# Patient Record
Sex: FEMALE | Race: WHITE | NOT HISPANIC OR LATINO | URBAN - METROPOLITAN AREA
[De-identification: names, ages, dates, MRNs, and addresses within clinical notes are randomized per-mention and may not be internally consistent; named-entity substitution may affect disease eponyms.]

---

## 2017-11-04 ENCOUNTER — EMERGENCY (EMERGENCY)
Facility: HOSPITAL | Age: 82
LOS: 1 days | Discharge: HOME | End: 2017-11-04
Attending: EMERGENCY MEDICINE | Admitting: EMERGENCY MEDICINE
Payer: MEDICARE

## 2017-11-04 VITALS
HEART RATE: 70 BPM | SYSTOLIC BLOOD PRESSURE: 186 MMHG | RESPIRATION RATE: 14 BRPM | WEIGHT: 113.1 LBS | DIASTOLIC BLOOD PRESSURE: 56 MMHG | OXYGEN SATURATION: 98 % | HEIGHT: 64 IN | TEMPERATURE: 98 F

## 2017-11-04 VITALS
OXYGEN SATURATION: 98 % | SYSTOLIC BLOOD PRESSURE: 154 MMHG | TEMPERATURE: 98 F | DIASTOLIC BLOOD PRESSURE: 60 MMHG | RESPIRATION RATE: 15 BRPM | HEART RATE: 68 BPM

## 2017-11-04 PROCEDURE — 73030 X-RAY EXAM OF SHOULDER: CPT | Mod: 26,RT

## 2017-11-04 PROCEDURE — 73020 X-RAY EXAM OF SHOULDER: CPT

## 2017-11-04 PROCEDURE — 73080 X-RAY EXAM OF ELBOW: CPT | Mod: 26,RT

## 2017-11-04 PROCEDURE — 73060 X-RAY EXAM OF HUMERUS: CPT

## 2017-11-04 PROCEDURE — 73020 X-RAY EXAM OF SHOULDER: CPT | Mod: 26,59,RT

## 2017-11-04 PROCEDURE — 99285 EMERGENCY DEPT VISIT HI MDM: CPT

## 2017-11-04 PROCEDURE — 99284 EMERGENCY DEPT VISIT MOD MDM: CPT | Mod: 25

## 2017-11-04 PROCEDURE — 73060 X-RAY EXAM OF HUMERUS: CPT | Mod: 26,RT

## 2017-11-04 PROCEDURE — 73030 X-RAY EXAM OF SHOULDER: CPT

## 2017-11-04 PROCEDURE — 73080 X-RAY EXAM OF ELBOW: CPT

## 2017-11-04 RX ORDER — ASPIRIN/CALCIUM CARB/MAGNESIUM 324 MG
1 TABLET ORAL
Qty: 0 | Refills: 0 | COMMUNITY

## 2017-11-04 RX ORDER — IBUPROFEN 200 MG
1 TABLET ORAL
Qty: 40 | Refills: 0 | OUTPATIENT
Start: 2017-11-04 | End: 2017-11-14

## 2017-11-04 NOTE — ED ADULT NURSE NOTE - CHPI ED SYMPTOMS NEG
no abrasion/no back pain/no tingling/no weakness/no deformity/no numbness/no bruising/no difficulty bearing weight/no stiffness

## 2017-11-04 NOTE — ED ADULT TRIAGE NOTE - CHIEF COMPLAINT QUOTE
"I couldn't get up with my right side."  pt c/o weakness to right side, thinks she fell; per daughter, pt was home with aide, went into bedroom and shut door behind her; aide heard a thump and pt was on right side, could not get up on own; pt c/o pain and weakness to right upper arm and side

## 2017-11-04 NOTE — ED PROVIDER NOTE - OBJECTIVE STATEMENT
89 yo female s/p mechanical fall onto right side while trying to close the bathroom door today c/o right shoulder/upper arm pain.  Denies any head injury, no neck pain, no nausea/vomiting.  Having difficulty ranging right shoulder.

## 2017-11-04 NOTE — ED ADULT NURSE NOTE - PMH
Hyperlipemia    Hypertension    Osteoarthritis    Regurgitation  mild to moderate  Renal Mass    Vertigo

## 2017-11-04 NOTE — ED PROVIDER NOTE - MUSCULOSKELETAL, MLM
Spine appears normal, range of motion is limited in right shoulder secondary to pain, +right shoulder swelling +ttp, no deformity Spine appears normal, range of motion is limited in right shoulder secondary to pain, +right shoulder swelling +ttp, mild deformity

## 2017-11-04 NOTE — CONSULT NOTE ADULT - SUBJECTIVE AND OBJECTIVE BOX
89y Female RHD presents c/o R shoulder pain sp mechanical fall. Denies HS/LOC. Denies numbness/tingling. Denies fever/chills. Denies pain/injury elsewhere. No other complaints.     HEALTH ISSUES - PROBLEM Dx:  dementia, HLD, HTN, Renal mass, vertigo      MEDICATIONS  (STANDING):    Allergies    Darvocet-N 100 (Unknown)  Nexium (Unknown)    Vital Signs Last 24 Hrs  T(C): 36.9 (11-04-17 @ 15:52), Max: 36.9 (11-04-17 @ 15:52)  T(F): 98.4 (11-04-17 @ 15:52), Max: 98.4 (11-04-17 @ 15:52)  HR: 70 (11-04-17 @ 15:52) (70 - 70)  BP: 186/56 (11-04-17 @ 15:52) (186/56 - 186/56)  BP(mean): --  RR: 14 (11-04-17 @ 15:52) (14 - 14)  SpO2: 98% (11-04-17 @ 15:52) (98% - 98%)  Imaging: XR demonstrates R proximal humerus fracture    Physical Exam  Gen: NAD, Alert and Awake, Follows Commands  R UE: Skin intact, Moderate Swelling to shoulder noted. Cannot AROM shoulder due to pain. r/u/m/ain/pin function intact. SILT over deltoid. SILT C5-T1, warm to touch. 2+ radial pulse. Compartments soft and compressible.     A/P: 89y Female with R proximal humerus fracture  Pain control  NWB R UE   Sling at all times  Ice plenty  Active movement of fingers/elbow encouraged  All question answered  Follow up with Dr. Olmos as outpatient within 1 week, call office for appointment   Ortho stable  for DC 89y Female RHD presents c/o R shoulder pain sp mechanical fall. Denies HS/LOC. Denies numbness/tingling. Denies fever/chills. Denies pain/injury elsewhere. No other complaints.     HEALTH ISSUES - PROBLEM Dx:  dementia, HLD, HTN, Renal mass, vertigo      MEDICATIONS  (STANDING):    Allergies    Darvocet-N 100 (Unknown)  Nexium (Unknown)    Vital Signs Last 24 Hrs  T(C): 36.9 (11-04-17 @ 15:52), Max: 36.9 (11-04-17 @ 15:52)  T(F): 98.4 (11-04-17 @ 15:52), Max: 98.4 (11-04-17 @ 15:52)  HR: 70 (11-04-17 @ 15:52) (70 - 70)  BP: 186/56 (11-04-17 @ 15:52) (186/56 - 186/56)  BP(mean): --  RR: 14 (11-04-17 @ 15:52) (14 - 14)  SpO2: 98% (11-04-17 @ 15:52) (98% - 98%)  Imaging: XR demonstrates R proximal humerus fracture, without dislocation    Physical Exam  Gen: NAD, Alert and Awake, Follows Commands  R UE: Skin intact, Moderate Swelling to shoulder noted. Cannot AROM shoulder due to pain. r/u/m/ain/pin function intact. SILT over deltoid. SILT C5-T1, warm to touch. 2+ radial pulse. Compartments soft and compressible.     A/P: 89y Female with R proximal humerus fracture  Pain control  NWB R UE   Sling at all times  Ice plenty  Active movement of fingers/elbow encouraged  All question answered  Follow up with Dr. Olmos as outpatient within 1 week, call office for appointment   Ortho stable  for DC

## 2017-11-04 NOTE — ED PROVIDER NOTE - PROGRESS NOTE DETAILS
discussed case with [facility]o resident, recommend sling, f/u with Dr. Oloms discussed case with [facility] ortho resident, recommend sling, f/u with Dr. Olmos, patient declining percocet, just wants motrin.  Did not want any analgesia here

## 2017-11-04 NOTE — ED ADULT NURSE REASSESSMENT NOTE - NS ED NURSE REASSESS COMMENT FT1
pt waiting for orthro eval
rt arm sling applied per Dr Callahan order.  reviewed d/c instructions with pt and pt daughter.  aware need to follow up with orthro  return new worsening s/s

## 2017-11-04 NOTE — ED PROVIDER NOTE - MEDICAL DECISION MAKING DETAILS
right shoulder fracture, sling, ortho consult right shoulder fracture, sling, ortho consult, analgesia

## 2018-02-16 ENCOUNTER — EMERGENCY (EMERGENCY)
Facility: HOSPITAL | Age: 83
LOS: 1 days | Discharge: HOME | End: 2018-02-16
Attending: INTERNAL MEDICINE | Admitting: INTERNAL MEDICINE
Payer: MEDICARE

## 2018-02-16 VITALS
WEIGHT: 119.93 LBS | OXYGEN SATURATION: 95 % | HEIGHT: 62 IN | HEART RATE: 67 BPM | TEMPERATURE: 98 F | RESPIRATION RATE: 16 BRPM | SYSTOLIC BLOOD PRESSURE: 175 MMHG | DIASTOLIC BLOOD PRESSURE: 73 MMHG

## 2018-02-16 PROCEDURE — 99285 EMERGENCY DEPT VISIT HI MDM: CPT | Mod: 25

## 2018-02-16 PROCEDURE — 12001 RPR S/N/AX/GEN/TRNK 2.5CM/<: CPT

## 2018-02-16 RX ORDER — SODIUM CHLORIDE 9 MG/ML
1000 INJECTION INTRAMUSCULAR; INTRAVENOUS; SUBCUTANEOUS ONCE
Qty: 0 | Refills: 0 | Status: DISCONTINUED | OUTPATIENT
Start: 2018-02-16 | End: 2018-02-16

## 2018-02-16 NOTE — ED PROVIDER NOTE - OBJECTIVE STATEMENT
89 y/o w/f Hyperlipemia  Hypertension  Osteoarthritis  Regurgitation  mild to moderate Renal Mass  Vertigo, The patient got up from bed too quickly, became dizzy and hit the back of her head, no HA, no LOC, no focal neuro changes.

## 2018-02-16 NOTE — ED ADULT TRIAGE NOTE - CHIEF COMPLAINT QUOTE
as per ems patient waas in the bathroom, stood up, felt dizzy and fell, hit head on the bathtub, loc present, c/o feeling tired

## 2018-02-16 NOTE — ED ADULT NURSE NOTE - OBJECTIVE STATEMENT
@0015 received and assumed care of pt aao x2 ( person and place- per daughter baseline mentation )in no obvious or acute distress. pt is a 89 y/o f presents to ed bib ems accompanied by daughter with head trauma + laceration posterior scalp ( no active bleeding). denies chest pain, sob, palpitations, dizziness, headache, nausea, vomiting. no focal neuro changes. seen and eval by dr. hernandez. ekg called & will send to ct scan. dispo pending, will continue to monitor. @0015 received and assumed care of pt aao x2 ( person and place- per daughter baseline mentation )in no obvious or acute distress. pt is a 89 y/o f presents to ed bib ems accompanied by daughter with head trauma + laceration posterior scalp ( no active bleeding). denies chest pain, sob, palpitations, dizziness, headache, nausea, vomiting. no focal neuro changes. seen and eval by dr. hernandez. ekg called & will send to ct scan. dispo pending, will continue to monitor.    @0110 pt eager for discharge, laceration repaired my dr. hernandez- tolerated well, pt will be discharged with daughter .

## 2018-02-16 NOTE — ED PROVIDER NOTE - SIGNIFICANT NEGATIVE FINDINGS
no headache, no LOC, no neck pain, no chest pain, no SOB, no palpitations, no n/v,  no neuro changes.

## 2018-02-16 NOTE — ED PROVIDER NOTE - ENMT, MLM
Airway patent, Nasal mucosa clear. Mouth with normal mucosa. Throat has no vesicles, no oropharyngeal exudates and uvula is midline. occipital scalp laceration 2.5cm wound irrigated and single staple

## 2018-02-17 VITALS
DIASTOLIC BLOOD PRESSURE: 71 MMHG | HEART RATE: 68 BPM | OXYGEN SATURATION: 96 % | RESPIRATION RATE: 17 BRPM | SYSTOLIC BLOOD PRESSURE: 162 MMHG

## 2018-02-17 PROCEDURE — 70450 CT HEAD/BRAIN W/O DYE: CPT

## 2018-02-17 PROCEDURE — 12001 RPR S/N/AX/GEN/TRNK 2.5CM/<: CPT

## 2018-02-17 PROCEDURE — 70450 CT HEAD/BRAIN W/O DYE: CPT | Mod: 26

## 2018-02-17 PROCEDURE — 99284 EMERGENCY DEPT VISIT MOD MDM: CPT | Mod: 25

## 2018-02-17 PROCEDURE — 93005 ELECTROCARDIOGRAM TRACING: CPT

## 2019-02-22 ENCOUNTER — EMERGENCY (EMERGENCY)
Facility: HOSPITAL | Age: 84
LOS: 1 days | Discharge: HOME | End: 2019-02-22
Attending: EMERGENCY MEDICINE | Admitting: EMERGENCY MEDICINE
Payer: MEDICARE

## 2019-02-22 VITALS
OXYGEN SATURATION: 96 % | DIASTOLIC BLOOD PRESSURE: 80 MMHG | TEMPERATURE: 98 F | RESPIRATION RATE: 18 BRPM | HEART RATE: 100 BPM | WEIGHT: 110.01 LBS | SYSTOLIC BLOOD PRESSURE: 144 MMHG | HEIGHT: 62 IN

## 2019-02-22 VITALS
DIASTOLIC BLOOD PRESSURE: 76 MMHG | HEART RATE: 86 BPM | SYSTOLIC BLOOD PRESSURE: 183 MMHG | TEMPERATURE: 98 F | RESPIRATION RATE: 18 BRPM | OXYGEN SATURATION: 98 %

## 2019-02-22 LAB
ALBUMIN SERPL ELPH-MCNC: 2.6 G/DL — LOW (ref 3.3–5)
ALP SERPL-CCNC: 87 U/L — SIGNIFICANT CHANGE UP (ref 40–120)
ALT FLD-CCNC: 14 U/L — SIGNIFICANT CHANGE UP (ref 12–78)
ANION GAP SERPL CALC-SCNC: 11 MMOL/L — SIGNIFICANT CHANGE UP (ref 5–17)
APPEARANCE UR: CLEAR — SIGNIFICANT CHANGE UP
AST SERPL-CCNC: 24 U/L — SIGNIFICANT CHANGE UP (ref 15–37)
BACTERIA # UR AUTO: ABNORMAL
BASOPHILS # BLD AUTO: 0.05 K/UL — SIGNIFICANT CHANGE UP (ref 0–0.2)
BASOPHILS NFR BLD AUTO: 0.3 % — SIGNIFICANT CHANGE UP (ref 0–2)
BILIRUB SERPL-MCNC: 0.8 MG/DL — SIGNIFICANT CHANGE UP (ref 0.2–1.2)
BILIRUB UR-MCNC: NEGATIVE — SIGNIFICANT CHANGE UP
BLD GP AB SCN SERPL QL: SIGNIFICANT CHANGE UP
BUN SERPL-MCNC: 17 MG/DL — SIGNIFICANT CHANGE UP (ref 7–23)
CALCIUM SERPL-MCNC: 7.8 MG/DL — LOW (ref 8.5–10.1)
CHLORIDE SERPL-SCNC: 110 MMOL/L — HIGH (ref 96–108)
CK MB BLD-MCNC: 1.7 % — SIGNIFICANT CHANGE UP (ref 0–3.5)
CK MB CFR SERPL CALC: 1.1 NG/ML — SIGNIFICANT CHANGE UP (ref 0–3.6)
CK SERPL-CCNC: 66 U/L — SIGNIFICANT CHANGE UP (ref 26–192)
CO2 SERPL-SCNC: 21 MMOL/L — LOW (ref 22–31)
COLOR SPEC: YELLOW — SIGNIFICANT CHANGE UP
CREAT SERPL-MCNC: 0.85 MG/DL — SIGNIFICANT CHANGE UP (ref 0.5–1.3)
DIFF PNL FLD: ABNORMAL
EOSINOPHIL # BLD AUTO: 0.03 K/UL — SIGNIFICANT CHANGE UP (ref 0–0.5)
EOSINOPHIL NFR BLD AUTO: 0.2 % — SIGNIFICANT CHANGE UP (ref 0–6)
EPI CELLS # UR: SIGNIFICANT CHANGE UP
GLUCOSE SERPL-MCNC: 123 MG/DL — HIGH (ref 70–99)
GLUCOSE UR QL: NEGATIVE — SIGNIFICANT CHANGE UP
HCT VFR BLD CALC: 39.2 % — SIGNIFICANT CHANGE UP (ref 34.5–45)
HGB BLD-MCNC: 12.7 G/DL — SIGNIFICANT CHANGE UP (ref 11.5–15.5)
IMM GRANULOCYTES NFR BLD AUTO: 0.4 % — SIGNIFICANT CHANGE UP (ref 0–1.5)
KETONES UR-MCNC: ABNORMAL
LEUKOCYTE ESTERASE UR-ACNC: NEGATIVE — SIGNIFICANT CHANGE UP
LYMPHOCYTES # BLD AUTO: 1.11 K/UL — SIGNIFICANT CHANGE UP (ref 1–3.3)
LYMPHOCYTES # BLD AUTO: 7.3 % — LOW (ref 13–44)
MCHC RBC-ENTMCNC: 29.3 PG — SIGNIFICANT CHANGE UP (ref 27–34)
MCHC RBC-ENTMCNC: 32.4 GM/DL — SIGNIFICANT CHANGE UP (ref 32–36)
MCV RBC AUTO: 90.3 FL — SIGNIFICANT CHANGE UP (ref 80–100)
MONOCYTES # BLD AUTO: 0.83 K/UL — SIGNIFICANT CHANGE UP (ref 0–0.9)
MONOCYTES NFR BLD AUTO: 5.5 % — SIGNIFICANT CHANGE UP (ref 2–14)
NEUTROPHILS # BLD AUTO: 13.1 K/UL — HIGH (ref 1.8–7.4)
NEUTROPHILS NFR BLD AUTO: 86.3 % — HIGH (ref 43–77)
NITRITE UR-MCNC: NEGATIVE — SIGNIFICANT CHANGE UP
NRBC # BLD: 0 /100 WBCS — SIGNIFICANT CHANGE UP (ref 0–0)
PH UR: 5 — SIGNIFICANT CHANGE UP (ref 5–8)
PLATELET # BLD AUTO: 276 K/UL — SIGNIFICANT CHANGE UP (ref 150–400)
POTASSIUM SERPL-MCNC: 3.6 MMOL/L — SIGNIFICANT CHANGE UP (ref 3.5–5.3)
POTASSIUM SERPL-SCNC: 3.6 MMOL/L — SIGNIFICANT CHANGE UP (ref 3.5–5.3)
PROT SERPL-MCNC: 6.2 G/DL — SIGNIFICANT CHANGE UP (ref 6–8.3)
PROT UR-MCNC: 25 MG/DL
RBC # BLD: 4.34 M/UL — SIGNIFICANT CHANGE UP (ref 3.8–5.2)
RBC # FLD: 13.2 % — SIGNIFICANT CHANGE UP (ref 10.3–14.5)
RBC CASTS # UR COMP ASSIST: SIGNIFICANT CHANGE UP /HPF (ref 0–4)
SODIUM SERPL-SCNC: 142 MMOL/L — SIGNIFICANT CHANGE UP (ref 135–145)
SP GR SPEC: 1.01 — SIGNIFICANT CHANGE UP (ref 1.01–1.02)
TROPONIN I SERPL-MCNC: 0.03 NG/ML — SIGNIFICANT CHANGE UP (ref 0.01–0.04)
UROBILINOGEN FLD QL: NEGATIVE — SIGNIFICANT CHANGE UP
WBC # BLD: 15.18 K/UL — HIGH (ref 3.8–10.5)
WBC # FLD AUTO: 15.18 K/UL — HIGH (ref 3.8–10.5)
WBC UR QL: SIGNIFICANT CHANGE UP

## 2019-02-22 PROCEDURE — 86901 BLOOD TYPING SEROLOGIC RH(D): CPT

## 2019-02-22 PROCEDURE — 70450 CT HEAD/BRAIN W/O DYE: CPT

## 2019-02-22 PROCEDURE — 82550 ASSAY OF CK (CPK): CPT

## 2019-02-22 PROCEDURE — 82553 CREATINE MB FRACTION: CPT

## 2019-02-22 PROCEDURE — 71045 X-RAY EXAM CHEST 1 VIEW: CPT | Mod: 26

## 2019-02-22 PROCEDURE — 86850 RBC ANTIBODY SCREEN: CPT

## 2019-02-22 PROCEDURE — 70450 CT HEAD/BRAIN W/O DYE: CPT | Mod: 26

## 2019-02-22 PROCEDURE — 99285 EMERGENCY DEPT VISIT HI MDM: CPT

## 2019-02-22 PROCEDURE — 84484 ASSAY OF TROPONIN QUANT: CPT

## 2019-02-22 PROCEDURE — 80053 COMPREHEN METABOLIC PANEL: CPT

## 2019-02-22 PROCEDURE — 36415 COLL VENOUS BLD VENIPUNCTURE: CPT

## 2019-02-22 PROCEDURE — 99284 EMERGENCY DEPT VISIT MOD MDM: CPT | Mod: 25

## 2019-02-22 PROCEDURE — 71045 X-RAY EXAM CHEST 1 VIEW: CPT

## 2019-02-22 PROCEDURE — 86900 BLOOD TYPING SEROLOGIC ABO: CPT

## 2019-02-22 PROCEDURE — 96360 HYDRATION IV INFUSION INIT: CPT

## 2019-02-22 PROCEDURE — 81001 URINALYSIS AUTO W/SCOPE: CPT

## 2019-02-22 PROCEDURE — 82962 GLUCOSE BLOOD TEST: CPT

## 2019-02-22 PROCEDURE — 85027 COMPLETE CBC AUTOMATED: CPT

## 2019-02-22 RX ORDER — LOSARTAN POTASSIUM 100 MG/1
1 TABLET, FILM COATED ORAL
Qty: 0 | Refills: 0 | COMMUNITY

## 2019-02-22 RX ORDER — MEMANTINE HYDROCHLORIDE 10 MG/1
1 TABLET ORAL
Qty: 0 | Refills: 0 | COMMUNITY

## 2019-02-22 RX ORDER — CEFUROXIME AXETIL 250 MG
1 TABLET ORAL
Qty: 14 | Refills: 0 | OUTPATIENT
Start: 2019-02-22 | End: 2019-02-28

## 2019-02-22 RX ORDER — CEFUROXIME AXETIL 250 MG
500 TABLET ORAL ONCE
Qty: 0 | Refills: 0 | Status: COMPLETED | OUTPATIENT
Start: 2019-02-22 | End: 2019-02-22

## 2019-02-22 RX ORDER — AMLODIPINE BESYLATE 2.5 MG/1
1 TABLET ORAL
Qty: 0 | Refills: 0 | COMMUNITY

## 2019-02-22 RX ORDER — ALPRAZOLAM 0.25 MG
1 TABLET ORAL
Qty: 0 | Refills: 0 | COMMUNITY

## 2019-02-22 RX ORDER — SODIUM CHLORIDE 9 MG/ML
1000 INJECTION INTRAMUSCULAR; INTRAVENOUS; SUBCUTANEOUS ONCE
Qty: 0 | Refills: 0 | Status: COMPLETED | OUTPATIENT
Start: 2019-02-22 | End: 2019-02-22

## 2019-02-22 RX ORDER — ASPIRIN/CALCIUM CARB/MAGNESIUM 324 MG
2 TABLET ORAL
Qty: 0 | Refills: 0 | COMMUNITY

## 2019-02-22 RX ORDER — SERTRALINE 25 MG/1
1 TABLET, FILM COATED ORAL
Qty: 0 | Refills: 0 | COMMUNITY

## 2019-02-22 RX ORDER — MIRTAZAPINE 45 MG/1
1 TABLET, ORALLY DISINTEGRATING ORAL
Qty: 0 | Refills: 0 | COMMUNITY

## 2019-02-22 RX ORDER — ATORVASTATIN CALCIUM 80 MG/1
1 TABLET, FILM COATED ORAL
Qty: 0 | Refills: 0 | COMMUNITY

## 2019-02-22 RX ADMIN — SODIUM CHLORIDE 1000 MILLILITER(S): 9 INJECTION INTRAMUSCULAR; INTRAVENOUS; SUBCUTANEOUS at 22:18

## 2019-02-22 RX ADMIN — SODIUM CHLORIDE 1000 MILLILITER(S): 9 INJECTION INTRAMUSCULAR; INTRAVENOUS; SUBCUTANEOUS at 21:00

## 2019-02-22 RX ADMIN — Medication 500 MILLIGRAM(S): at 22:58

## 2019-02-22 NOTE — ED PROVIDER NOTE - CLINICAL SUMMARY MEDICAL DECISION MAKING FREE TEXT BOX
PT with hx tia dementia complex found to be slumped over now well, had episode of diarrhea after  ro cardiac arrhythmia or hypotension causing watershed tia resolved with resolution of bp vs oter vascular ro gi ro acs neuor   perlaer on R B A TO TPA and it's current contraindications in rapidly resolved sx of neuro and nihss=0

## 2019-02-22 NOTE — ED PROVIDER NOTE - OBJECTIVE STATEMENT
Pt is a 89 yo f who has hx of dementia and 3 prior tia's. she also has hx of elev chol and htn worsening confusion over past few months due to dementia now not taking her meds which was approved by her neurologist dr ROSALVA sands and pmd dr Lowery.  Today daughter came home found mom sitting in chair relaxing, awake and alert to usual.  she went to fix a bowl of ice cream for pt and when she came back at around 6:30 pt was slumped to the right, confused slurring speech and gazing off.  the sx lasted about 1 minute, where her previous tia's lasted moments.  when she returned to baseline, pt expressed and urge to have a bm and diarrhea, then told daughter she wanted to go to hosptial because she did not feel well.  bib ems for eval. no longer symptomatic  pt states she feels fine

## 2019-02-22 NOTE — ED PROVIDER NOTE - CARE PLAN
Principal Discharge DX:	TIA (transient ischemic attack)  Secondary Diagnosis:	Urinary symptom or sign  Secondary Diagnosis:	Leukocytosis

## 2019-02-22 NOTE — ED ADULT NURSE NOTE - PMH
Hyperlipemia    Hypertension    Osteoarthritis    Regurgitation  mild to moderate  Renal Mass    TIA (transient ischemic attack)    Vertigo

## 2019-02-22 NOTE — ED PROVIDER NOTE - UNABLE TO OBTAIN
data per daughter who witnessed the episode Dementia data per daughter at bedside who wittnessed event

## 2019-02-22 NOTE — ED PROVIDER NOTE - PROGRESS NOTE DETAILS
I had an at length discussion with daughters and sons at bedside.  All state that they want to take pt home and that given her frequency of these events in past this being number 4 they don't want anything too aggressive.  there is no formal moslt or advanced directives created, but they will do so with pmd  in addition we will stright cath to ro uti. they don't want further regarding the elev wbc count

## 2019-02-22 NOTE — ED ADULT NURSE NOTE - OBJECTIVE STATEMENT
Patient brought in by ambulance from home as reported had slurred speech and rolled her eyes noticed by family at 6:30 pm now able to talk and no facial droop no slurred speech, no facial droop able to move bilateral arms and legs seen and evaluated by MD with orders made and carried out EKG done attached to cardiac monitor.

## 2019-02-22 NOTE — ED ADULT TRIAGE NOTE - CHIEF COMPLAINT QUOTE
patient with episode of eyes rolling back, facial droop and unable to speak at approx 630 pm as witnessed by daughter, then episode of diarrhea and vomiting. Hx of multiple TIA's in the past

## 2019-06-20 NOTE — ED PROVIDER NOTE - PSH
Community Care Team documentation for patient in [facility]  Initial Follow Up       Patient was discharged to [facility]. Information included in this progress note has been provided to SNF. Hospital Admission and Diagnosis: [facility] 6/8-6/13  Possible pulmonary edema     RRAT Score: 12      Advance Care Planning:  Not on file, per Palliative consult patient wants full code     PCP : Selene Forrest MD    SNF Attending:  Nancy Joseph MD    Spoke with SNF team. Ensured patient arrived to SNF safely with admission packet in order. Provided needed hospital follow up appointments: Luis Espinal MD Hematology and Oncology In 3 weeks; Ortho Dr. Ileana Brown as needed. SNF Medical update: Clysis hydration provided due to patient not having much intake. Na level being checked today. PT/OT update: Currently ambulating 100 ft with walker and CGA. CGA with transfers. Min assist with upper body ADLs. Max assist with lower body ADLs. Barriers: cognition. Anticipated LOS/discharge date:  2-3 weeks. Disposition:  Plan for return to home alone. Son works and wants patient to be able to move around at home on her own. Home health/DME:  Previously used [facility]. Community Care Team will follow up weekly with [facility] until discharge. Medications were not reconciled and general patient assessment was not completed during this [facility] outreach.       Rizwan Potter, MSN, RN, ACNS-BC, [facility]  Nurse Navigator, [employer] [phone]
No significant past surgical history

## 2019-07-22 NOTE — ED ADULT NURSE NOTE - CCCP TRG CHIEF CMPLNT
fall
R/O PROBLEM DIAGNOSES  Problem: Localized swelling, mass and lump, left upper limb  Assessment and Plan: excisional biopsy of left wrist mass  possible local flap

## 2020-03-16 NOTE — ED PROVIDER NOTE - CPE EDP ENMT NORM
Referred by: ARGELIA Streeter; Medical Diagnosis (from order):    Diagnosis Information      Diagnosis    V45.89 (ICD-9-CM) - Z98.890 (ICD-10-CM) - S/P arthroscopy of right shoulder                Physical Therapy -  Daily Treatment Note    Visit:  2     SUBJECTIVE                                                                                                             Patient reports \"very painful morning\", 3 hours of sleeping at the most.  Rates pain at night 8/10. Ice machine is helping.   Functional Change: Compliant with HEP.      Pain / Symptoms:  Pain rating (out of 10): Current: 1     OBJECTIVE                                                                                                                          TREATMENT                                                                                                                  Therapeutic Exercise:  PROM right shoulder within protocol restrictions  Flexion, abduction, IR, ER     Reviewed the following: doing well  Scap squeezes  Pendulums    Bag of ice given to patient to go. Discussed proper use and safety with ice and patient verbalizes understanding.  Manual Therapy:  Right shoulder:   GH joint mobs: distraction, posterior, anterior, & inferior grades II-III   Oscillations with light distraction   Gentle STM surrounding joint - most tender anterior aspect      ASSESSMENT                                                                                                             Patient tolerated session well.  Most tender with right shoulder flexion.  All PROM met restriction guidelines.  Independent with current HEP.  Ice to go.  Patient did not formerly rate pain after session.     PLAN                                                                                                                             Suggestions for next session as indicated: Progress per plan of care  Progress per type 2 protocol.      Phase 1 - Maximum Protection  2/4/20 - 3/17/20  0-6 weeks post-operatively  4 week PROM Check - Goals              Flexion - 90°              ABD - 90°              I.R. & E.R. at 45° ABD - 30°  PROM - Maintain limits              Flexion - 120°              ABD - 90°              I.R. & E.R. at 45° ABD - 45°              AAROM - Ext. Rot. at 45° ABD in scapular plane     Phase 2 - AROM  6-12 weeks post-operatively 3/17/20 - 4/28/20  PROM - Progress as tolerated  AAROM - Flexion, ABD, I.R. & E.R.  AROM - Progress gradually  Sub-Maximal Shoulder and Rotator Cuff Isometrics  Sub-Maximal Neuromuscular Stabilization Drills  Light Isotonic Scapular and Total Arm Strengthening  Sub-Body Weight Closed Kinetic Chain Exercises     Phase 3 - Strength  12-20 weeks post-operatively 4/28/20 - 6/23/20  Continue PROM, AAROM, and AROM  Progress Neuromuscular Stabilization Drills  Progress Isotonic Scapular and Total Arm Strengthening  Progress Closed Kinetic Chain Exercises  Light Isotonic Shoulder and Rotator Cuff Strengthening     Phase 4 - [id number]  20+ weeks post-operatively 6/23/20 +  Continue Phase III Exercises  Traditional Weight Lifting Exercises  Sport Specific Training Program  Work Specialty Rehabilitation Program  Establish HEP for independent long-term management       Procedures and total treatment time documented Time Entry flowsheet.     normal...

## 2021-03-14 NOTE — ED PROVIDER NOTE - NS ED ATTENDING STATEMENT MOD
Problem: Adult Inpatient Plan of Care  Goal: Plan of Care Review  Outcome: Ongoing, Progressing  Flowsheets (Taken 3/14/2021 1601)  Progress: no change  Plan of Care Reviewed With: patient  Outcome Summary: Will continue to monitor patient.  Goal: Patient-Specific Goal (Individualized)  Outcome: Ongoing, Progressing  Goal: Absence of Hospital-Acquired Illness or Injury  Outcome: Ongoing, Progressing  Intervention: Identify and Manage Fall Risk  Recent Flowsheet Documentation  Taken 3/14/2021 1549 by Katya Harrington, RN  Safety Promotion/Fall Prevention: safety round/check completed  Intervention: Prevent Skin Injury  Recent Flowsheet Documentation  Taken 3/14/2021 1549 by Katya Harrington, RN  Body Position: side-lying, right  Goal: Optimal Comfort and Wellbeing  Outcome: Ongoing, Progressing  Goal: Readiness for Transition of Care  Outcome: Ongoing, Progressing   Goal Outcome Evaluation:  Plan of Care Reviewed With: patient  Progress: no change  Outcome Summary: Will continue to monitor patient.   Attending Only

## 2021-12-02 NOTE — ED PROVIDER NOTE - CROS ED HEME ALL NEG
What Type Of Note Output Would You Prefer (Optional)?: Standard Output How Severe Is Your Rash?: mild Is This A New Presentation, Or A Follow-Up?: Rash negative...

## 2022-12-15 NOTE — ED ADULT NURSE NOTE - CAS TRG GEN SKIN COLOR
Normal for race [Alert] : alert [No Acute Distress] : no acute distress [Normocephalic] : normocephalic [EOMI Bilateral] : EOMI bilateral [Clear tympanic membranes with bony landmarks and light reflex present bilaterally] : clear tympanic membranes with bony landmarks and light reflex present bilaterally  [Pink Nasal Mucosa] : pink nasal mucosa [Nonerythematous Oropharynx] : nonerythematous oropharynx [Supple, full passive range of motion] : supple, full passive range of motion [No Palpable Masses] : no palpable masses [Clear to Auscultation Bilaterally] : clear to auscultation bilaterally [Regular Rate and Rhythm] : regular rate and rhythm [Normal S1, S2 audible] : normal S1, S2 audible [No Murmurs] : no murmurs [+2 Femoral Pulses] : +2 femoral pulses [Soft] : soft [NonTender] : non tender [Non Distended] : non distended [Normoactive Bowel Sounds] : normoactive bowel sounds [No Hepatomegaly] : no hepatomegaly [No Splenomegaly] : no splenomegaly [No Abnormal Lymph Nodes Palpated] : no abnormal lymph nodes palpated [Normal Muscle Tone] : normal muscle tone [No Gait Asymmetry] : no gait asymmetry [No pain or deformities with palpation of bone, muscles, joints] : no pain or deformities with palpation of bone, muscles, joints [Straight] : straight [+2 Patella DTR] : +2 patella DTR [Cranial Nerves Grossly Intact] : cranial nerves grossly intact [No Rash or Lesions] : no rash or lesions

## 2023-03-24 NOTE — ED ADULT NURSE NOTE - PAIN RATING/NUMBER SCALE (0-10): ACTIVITY
[FreeTextEntry3] : I, Dr. Dickens, personally performed the evaluation and management (E/M) services for this established patient who presents today with (a) new problem(s)/exacerbation of (an) existing condition(s). That E/M includes conducting the examination, assessing all new/exacerbated conditions, and establishing a new plan of care. Today, my nurse practitioner, Christin Padilla, was here to observe my evaluation and management services fort his new problem/exacerbated condition to be follow going forward.\par 
0

## 2023-08-25 NOTE — ED PROVIDER NOTE - NEUROLOGICAL MOTOR
normal Hydroquinone Pregnancy And Lactation Text: This medication has not been assigned a Pregnancy Risk Category but animal studies failed to show danger with the topical medication. It is unknown if the medication is excreted in breast milk.